# Patient Record
Sex: FEMALE | Race: WHITE | Employment: FULL TIME | URBAN - METROPOLITAN AREA
[De-identification: names, ages, dates, MRNs, and addresses within clinical notes are randomized per-mention and may not be internally consistent; named-entity substitution may affect disease eponyms.]

---

## 2017-01-06 ENCOUNTER — OFFICE VISIT (OUTPATIENT)
Dept: FAMILY MEDICINE CLINIC | Age: 57
End: 2017-01-06

## 2017-01-06 VITALS
DIASTOLIC BLOOD PRESSURE: 80 MMHG | WEIGHT: 207 LBS | HEART RATE: 83 BPM | RESPIRATION RATE: 13 BRPM | SYSTOLIC BLOOD PRESSURE: 122 MMHG | HEIGHT: 67 IN | BODY MASS INDEX: 32.49 KG/M2

## 2017-01-06 DIAGNOSIS — E55.9 VITAMIN D INSUFFICIENCY: ICD-10-CM

## 2017-01-06 DIAGNOSIS — E89.0 HISTORY OF THYROIDECTOMY: ICD-10-CM

## 2017-01-06 DIAGNOSIS — E78.5 HYPERLIPIDEMIA, UNSPECIFIED HYPERLIPIDEMIA TYPE: ICD-10-CM

## 2017-01-06 DIAGNOSIS — R23.2 HOT FLASHES: Primary | ICD-10-CM

## 2017-01-06 DIAGNOSIS — R73.01 IFG (IMPAIRED FASTING GLUCOSE): ICD-10-CM

## 2017-01-06 DIAGNOSIS — R53.83 OTHER FATIGUE: ICD-10-CM

## 2017-01-06 DIAGNOSIS — R45.89 DEPRESSED MOOD: ICD-10-CM

## 2017-01-06 PROCEDURE — 99214 OFFICE O/P EST MOD 30 MIN: CPT | Performed by: FAMILY MEDICINE

## 2017-01-06 RX ORDER — ESCITALOPRAM OXALATE 10 MG/1
10 TABLET ORAL DAILY
Qty: 30 TABLET | Refills: 1 | Status: SHIPPED | OUTPATIENT
Start: 2017-01-06 | End: 2017-02-03 | Stop reason: SDUPTHER

## 2017-01-06 ASSESSMENT — ENCOUNTER SYMPTOMS
GASTROINTESTINAL NEGATIVE: 1
RESPIRATORY NEGATIVE: 1

## 2017-01-06 ASSESSMENT — PATIENT HEALTH QUESTIONNAIRE - PHQ9
2. FEELING DOWN, DEPRESSED OR HOPELESS: 0
SUM OF ALL RESPONSES TO PHQ QUESTIONS 1-9: 0
SUM OF ALL RESPONSES TO PHQ9 QUESTIONS 1 & 2: 0
1. LITTLE INTEREST OR PLEASURE IN DOING THINGS: 0

## 2017-02-03 ENCOUNTER — OFFICE VISIT (OUTPATIENT)
Dept: FAMILY MEDICINE CLINIC | Age: 57
End: 2017-02-03

## 2017-02-03 VITALS
BODY MASS INDEX: 32.33 KG/M2 | HEART RATE: 72 BPM | RESPIRATION RATE: 16 BRPM | WEIGHT: 206 LBS | DIASTOLIC BLOOD PRESSURE: 84 MMHG | SYSTOLIC BLOOD PRESSURE: 136 MMHG | HEIGHT: 67 IN

## 2017-02-03 DIAGNOSIS — R45.89 DEPRESSED MOOD: ICD-10-CM

## 2017-02-03 DIAGNOSIS — J01.90 ACUTE RHINOSINUSITIS: Primary | ICD-10-CM

## 2017-02-03 PROCEDURE — 99213 OFFICE O/P EST LOW 20 MIN: CPT | Performed by: FAMILY MEDICINE

## 2017-02-03 RX ORDER — ESCITALOPRAM OXALATE 10 MG/1
10 TABLET ORAL DAILY
Qty: 90 TABLET | Refills: 3 | Status: SHIPPED | OUTPATIENT
Start: 2017-02-03 | End: 2017-12-31 | Stop reason: SDUPTHER

## 2017-02-03 RX ORDER — AMOXICILLIN AND CLAVULANATE POTASSIUM 875; 125 MG/1; MG/1
1 TABLET, FILM COATED ORAL 2 TIMES DAILY WITH MEALS
Qty: 14 TABLET | Refills: 0 | Status: SHIPPED | OUTPATIENT
Start: 2017-02-03 | End: 2017-02-10

## 2017-02-03 ASSESSMENT — ENCOUNTER SYMPTOMS
COUGH: 1
GASTROINTESTINAL NEGATIVE: 1
SINUS PRESSURE: 1

## 2017-12-31 DIAGNOSIS — R45.89 DEPRESSED MOOD: ICD-10-CM

## 2018-01-02 RX ORDER — ESCITALOPRAM OXALATE 10 MG/1
10 TABLET ORAL DAILY
Qty: 90 TABLET | Refills: 0 | Status: SHIPPED | OUTPATIENT
Start: 2018-01-02

## 2018-03-06 ENCOUNTER — HOSPITAL ENCOUNTER (OUTPATIENT)
Dept: WOMENS IMAGING | Age: 58
Discharge: HOME | End: 2018-03-06
Payer: COMMERCIAL

## 2018-03-06 DIAGNOSIS — Z12.31 VISIT FOR SCREENING MAMMOGRAM: ICD-10-CM

## 2018-03-06 PROCEDURE — 77063 BREAST TOMOSYNTHESIS BI: CPT

## 2019-04-19 ENCOUNTER — HOSPITAL ENCOUNTER (OUTPATIENT)
Dept: WOMENS IMAGING | Age: 59
Discharge: HOME | End: 2019-04-19
Payer: COMMERCIAL

## 2019-04-19 DIAGNOSIS — Z12.31 VISIT FOR SCREENING MAMMOGRAM: ICD-10-CM

## 2019-04-19 PROCEDURE — 77063 BREAST TOMOSYNTHESIS BI: CPT

## 2020-05-29 ENCOUNTER — HOSPITAL ENCOUNTER (OUTPATIENT)
Dept: WOMENS IMAGING | Age: 60
Discharge: HOME | End: 2020-05-29
Payer: COMMERCIAL

## 2020-05-29 PROCEDURE — 77063 BREAST TOMOSYNTHESIS BI: CPT

## 2021-06-22 ENCOUNTER — HOSPITAL ENCOUNTER (OUTPATIENT)
Dept: WOMENS IMAGING | Age: 61
Discharge: HOME | End: 2021-06-22
Payer: COMMERCIAL

## 2021-06-22 DIAGNOSIS — Z12.31 VISIT FOR SCREENING MAMMOGRAM: ICD-10-CM

## 2021-06-22 PROCEDURE — 77063 BREAST TOMOSYNTHESIS BI: CPT

## 2022-07-19 ENCOUNTER — HOSPITAL ENCOUNTER (OUTPATIENT)
Dept: WOMENS IMAGING | Age: 62
Discharge: HOME | End: 2022-07-19
Payer: COMMERCIAL

## 2022-07-19 DIAGNOSIS — Z12.31 VISIT FOR SCREENING MAMMOGRAM: ICD-10-CM

## 2022-07-19 PROCEDURE — 77067 SCR MAMMO BI INCL CAD: CPT

## 2023-10-02 ENCOUNTER — TELEPHONE (OUTPATIENT)
Dept: CARDIOLOGY CLINIC | Age: 63
End: 2023-10-02

## 2023-10-02 NOTE — TELEPHONE ENCOUNTER
Received a fax which is scanned in chart. Looks like referral to urology. Do they need something from cardiology? Called [phone] and LM for April to see if they are needing something from cardiology.

## 2023-10-06 NOTE — TELEPHONE ENCOUNTER
Have made multiple attempts to reach office on this. Closing encounter at this time. Encounter faxed to Dr Neymar Garrett office as an uguay.

## 2023-10-10 ENCOUNTER — OFFICE VISIT (OUTPATIENT)
Dept: CARDIOLOGY CLINIC | Age: 63
End: 2023-10-10
Payer: COMMERCIAL

## 2023-10-10 VITALS
HEIGHT: 67 IN | DIASTOLIC BLOOD PRESSURE: 72 MMHG | WEIGHT: 200 LBS | SYSTOLIC BLOOD PRESSURE: 146 MMHG | HEART RATE: 71 BPM | BODY MASS INDEX: 31.39 KG/M2

## 2023-10-10 DIAGNOSIS — R06.09 DYSPNEA ON EXERTION: Primary | ICD-10-CM

## 2023-10-10 DIAGNOSIS — R53.83 FATIGUE, UNSPECIFIED TYPE: ICD-10-CM

## 2023-10-10 DIAGNOSIS — G47.30 SLEEP APNEA, UNSPECIFIED TYPE: ICD-10-CM

## 2023-10-10 PROCEDURE — 99204 OFFICE O/P NEW MOD 45 MIN: CPT | Performed by: INTERNAL MEDICINE

## 2023-10-10 RX ORDER — ROSUVASTATIN CALCIUM 10 MG/1
10 TABLET, COATED ORAL DAILY
COMMUNITY

## 2023-10-10 RX ORDER — AMLODIPINE BESYLATE 5 MG/1
5 TABLET ORAL DAILY
COMMUNITY

## 2023-10-10 RX ORDER — M-VIT,TX,IRON,MINS/CALC/FOLIC 27MG-0.4MG
1 TABLET ORAL DAILY
COMMUNITY

## 2023-10-10 NOTE — PROGRESS NOTES
[address] [place]  Dept: [phone]  Dept Fax: [phone]  Loc: [phone]    Visit Date: 10/10/2023    Ms. Kristie Ibarra is a 66 y.o. female  who presented for:  Chief Complaint   Patient presents with    Establish Cardiologist    New Patient    Check-Up       HPI:   65 yo F c hx of thyroid cancer s/p surgery is here for shortness of breath/fatigue. Thinks the symtpoms initially started with infusion with covid infection. She occasionally has low pulse rate. She was unable to walk when she went to the [facility]. EKG shows Sinus bradycardia. She is set up for sleep study. Has been having some intermitent pedal edema. Current Outpatient Medications:     amLODIPine (NORVASC) 5 MG tablet, Take 1 tablet by mouth daily, Disp: , Rfl:     rosuvastatin (CRESTOR) 10 MG tablet, Take 1 tablet by mouth daily, Disp: , Rfl:     Multiple Vitamins-Minerals (THERAPEUTIC MULTIVITAMIN-MINERALS) tablet, Take 1 tablet by mouth daily, Disp: , Rfl:     Cholecalciferol (VITAMIN D3 PO), Take by mouth, Disp: , Rfl:     escitalopram (LEXAPRO) 10 MG tablet, TAKE 1 TABLET BY MOUTH  DAILY, Disp: 90 tablet, Rfl: 0    levothyroxine (SYNTHROID) 150 MCG tablet, Take 1 tablet by mouth Daily, Disp: , Rfl:     Past Medical History  Elysia Concepcion  has a past medical history of Cancer ([address]), Kidney stone, and Nausea & vomiting. Social History  Elysia Concepcion  reports that she has never smoked. She has never used smokeless tobacco. She reports that she does not drink alcohol and does not use drugs. Family History  Elysia Concepcion family history includes Cancer in her father; Diabetes in her mother; Heart Disease in her father; High Blood Pressure in her father; Kidney Disease in her father; Ovarian Cancer (age of onset: 48) in her mother.     Past Surgical History   Past Surgical History:   Procedure Laterality Date     SECTION  1348,7088 Quality 111:Pneumonia Vaccination Status For Older Adults: Pneumococcal Vaccination Previously Received Detail Level: Detailed Quality 130: Documentation Of Current Medications In The Medical Record: Current Medications Documented Quality 131: Pain Assessment And Follow-Up: Pain assessment using a standardized tool is documented as negative, no follow-up plan required Quality 110: Preventive Care And Screening: Influenza Immunization: Influenza Immunization previously received during influenza season

## 2023-10-12 LAB — B-TYPE NATRIURETIC PEPTIDE: 52 PG/ML

## 2023-10-23 ENCOUNTER — HOSPITAL ENCOUNTER (OUTPATIENT)
Dept: NON INVASIVE DIAGNOSTICS | Age: 63
Discharge: HOME | End: 2023-10-23
Attending: INTERNAL MEDICINE
Payer: COMMERCIAL

## 2023-10-23 VITALS — WEIGHT: 200 LBS | BODY MASS INDEX: 31.39 KG/M2 | HEIGHT: 67 IN

## 2023-10-23 DIAGNOSIS — R06.09 DYSPNEA ON EXERTION: ICD-10-CM

## 2023-10-23 DIAGNOSIS — R53.83 FATIGUE, UNSPECIFIED TYPE: ICD-10-CM

## 2023-10-23 PROCEDURE — 78452 HT MUSCLE IMAGE SPECT MULT: CPT | Performed by: INTERNAL MEDICINE

## 2023-10-23 PROCEDURE — 93270 REMOTE 30 DAY ECG REV/REPORT: CPT

## 2023-10-23 PROCEDURE — 93017 CV STRESS TEST TRACING ONLY: CPT | Performed by: INTERNAL MEDICINE

## 2023-10-23 PROCEDURE — 6360000002 HC RX W HCPCS

## 2023-10-23 PROCEDURE — A9500 TC99M SESTAMIBI: HCPCS | Performed by: INTERNAL MEDICINE

## 2023-10-23 PROCEDURE — 93306 TTE W/DOPPLER COMPLETE: CPT

## 2023-10-23 PROCEDURE — 3430000000 HC RX DIAGNOSTIC RADIOPHARMACEUTICAL: Performed by: INTERNAL MEDICINE

## 2023-10-23 RX ORDER — TETRAKIS(2-METHOXYISOBUTYLISOCYANIDE)COPPER(I) TETRAFLUOROBORATE 1 MG/ML
30.8 INJECTION, POWDER, LYOPHILIZED, FOR SOLUTION INTRAVENOUS
Status: COMPLETED | OUTPATIENT
Start: 2023-10-23 | End: 2023-10-23

## 2023-10-23 RX ORDER — TETRAKIS(2-METHOXYISOBUTYLISOCYANIDE)COPPER(I) TETRAFLUOROBORATE 1 MG/ML
9.5 INJECTION, POWDER, LYOPHILIZED, FOR SOLUTION INTRAVENOUS
Status: COMPLETED | OUTPATIENT
Start: 2023-10-23 | End: 2023-10-23

## 2023-10-23 RX ADMIN — Medication 9.5 MILLICURIE: at 08:15

## 2023-10-23 RX ADMIN — Medication 30.8 MILLICURIE: at 09:04

## 2023-10-23 NOTE — PROCEDURES
Received critical report for cardiac event monitor. .faxed report to cardiology and perfect served Dr. Altagracia Jimenez

## 2023-10-23 NOTE — PROCEDURES
14 Day cardiac event monitor was applied to patient. Instructions were given and skin/monitor prep and application was demonstrated. Patient was instructed to remove monitor on 11/6 and mail back to [employer].

## 2023-10-24 ENCOUNTER — TELEPHONE (OUTPATIENT)
Dept: CARDIOLOGY CLINIC | Age: 63
End: 2023-10-24

## 2023-10-24 DIAGNOSIS — R94.31 ABNORMAL PATIENT-ACTIVATED CARDIAC EVENT MONITOR: Primary | ICD-10-CM

## 2023-10-25 ENCOUNTER — TELEPHONE (OUTPATIENT)
Dept: CARDIOLOGY CLINIC | Age: 63
End: 2023-10-25

## 2023-10-25 ENCOUNTER — OFFICE VISIT (OUTPATIENT)
Dept: CARDIOLOGY CLINIC | Age: 63
End: 2023-10-25
Payer: COMMERCIAL

## 2023-10-25 VITALS
HEART RATE: 60 BPM | WEIGHT: 228 LBS | OXYGEN SATURATION: 97 % | HEIGHT: 67 IN | DIASTOLIC BLOOD PRESSURE: 73 MMHG | SYSTOLIC BLOOD PRESSURE: 129 MMHG | BODY MASS INDEX: 35.79 KG/M2

## 2023-10-25 DIAGNOSIS — R94.31 ABNORMAL PATIENT-ACTIVATED CARDIAC EVENT MONITOR: Primary | ICD-10-CM

## 2023-10-25 PROCEDURE — 93000 ELECTROCARDIOGRAM COMPLETE: CPT | Performed by: INTERNAL MEDICINE

## 2023-10-25 PROCEDURE — 99204 OFFICE O/P NEW MOD 45 MIN: CPT | Performed by: INTERNAL MEDICINE

## 2023-10-25 NOTE — TELEPHONE ENCOUNTER
Dr Reinier Comer? Procedure: Dual Pacer L Bundle-MDT  Date: 11/17/23  Arrival Time: 0500  Meds to Hold: none    Instructions verbalized to the patient. Instructions given to the patient at the time of visit.

## 2023-10-25 NOTE — PATIENT INSTRUCTIONS
You may receive a survey regarding the care you received during your visit. Your input is valuable to us. We encourage you to complete and return your survey. We hope you will choose us in the future for your healthcare needs. Thank you for choosing [address] [place] [address]!     Your Medical Assistant Today:  Isidro Murillo      Your RN Today:  Ab Kuhn  Your Provider for Today: Dr. Aryan Isaacs  Ph. [phone] opt 1

## 2023-10-25 NOTE — PROGRESS NOTES
Physical Examination:  There were no vitals filed for this visit. There is no height or weight on file to calculate BMI. No intake or output data in the 24 hours ending 10/25/23 1427  [unfilled]   GENERAL: Alert and oriented. No distress. EYES: No pallor or icterus. ENT: No cyanosis. No thyromegaly or cervical LAP. VESSELS: No jugular venous distension or carotid bruits. HEART: Normal S1/S2. No murmur, rub or gallop. LUNGS: Clear to auscultation. ABDOMEN: Soft and non-tender. EXTREMITIES: No lower extremity edema. Feet are warm. NEUROLOGICAL: Grossly normal.     Laboratory And Diagnostic Data  I have personally reviewed and interpreted the results of the following diagnostic testing    Lab Results   Component Value Date    WBC 7.5 01/06/2017    HGB 14.3 01/06/2017    HCT 42.3 01/06/2017     01/06/2017    CHOL 251 (H) 01/06/2017    TRIG 164 01/06/2017    HDL 60 01/06/2017    ALT 52 02/03/2015    AST 37 02/03/2015     08/21/2015    K 4.1 08/21/2015     08/21/2015    CREATININE 0.7 08/21/2015    BUN 16 08/21/2015    CO2 27 08/21/2015    TSH 0.08 07/19/2016    LABA1C 5.2 01/06/2017            Echocardiogram dated 10/20/2023: 8. LVEF 60%, LVWT 1.1 cm, LAD/ADEOLA 26. No significant valvular abnormalities. ECG sinus rhythm, rate 58 bpm.  Prolonged OR interval.  Exercise myocardial perfusion 10/23/2023: Scan: Negative for ischemia. Progression of his degree AV block to complete AV block. Event monitor: Intermittent sinus bradycardia and high-grade AV block with a ventricular rate between 40 to 45 bpm occurring while awake corresponding to the patient's symptoms (10/24/2023 at 12:03 PM and 10/22/2023 at 8:10 AM). Impression:  Exertional fatigue, dizziness and lack of energy. Intermittent high-grade AV block. Hypertension and obesity. This of snoring, scheduled to have sleep study. Assessment And Recommendations:  No apparent reversible reason for patient's AV block.   She

## 2023-11-17 ENCOUNTER — HOSPITAL ENCOUNTER (OUTPATIENT)
Age: 63
Setting detail: OUTPATIENT SURGERY
Discharge: HOME | End: 2023-11-17
Attending: INTERNAL MEDICINE | Admitting: INTERNAL MEDICINE
Payer: COMMERCIAL

## 2023-11-17 ENCOUNTER — APPOINTMENT (OUTPATIENT)
Dept: GENERAL RADIOLOGY | Age: 63
End: 2023-11-17
Attending: INTERNAL MEDICINE
Payer: COMMERCIAL

## 2023-11-17 VITALS
DIASTOLIC BLOOD PRESSURE: 70 MMHG | HEIGHT: 67 IN | RESPIRATION RATE: 17 BRPM | OXYGEN SATURATION: 96 % | SYSTOLIC BLOOD PRESSURE: 132 MMHG | HEART RATE: 64 BPM | WEIGHT: 215 LBS | BODY MASS INDEX: 33.74 KG/M2

## 2023-11-17 DIAGNOSIS — I44.39 HIGH-GRADE ATRIOVENTRICULAR BLOCK: ICD-10-CM

## 2023-11-17 LAB
ALBUMIN SERPL BCG-MCNC: 4.1 G/DL (ref 3.5–5.1)
ALP SERPL-CCNC: 108 U/L (ref 38–126)
ALT SERPL W/O P-5'-P-CCNC: 18 U/L (ref 11–66)
ANION GAP SERPL CALC-SCNC: 9 MEQ/L (ref 8–16)
APTT PPP: 30.2 SECONDS (ref 22–38)
AST SERPL-CCNC: 18 U/L (ref 5–40)
BASOPHILS ABSOLUTE: 0 THOU/MM3 (ref 0–0.1)
BASOPHILS NFR BLD AUTO: 0.6 %
BILIRUB SERPL-MCNC: 0.5 MG/DL (ref 0.3–1.2)
BUN SERPL-MCNC: 17 MG/DL (ref 7–22)
CALCIUM SERPL-MCNC: 8.6 MG/DL (ref 8.5–10.5)
CHLORIDE SERPL-SCNC: 106 MEQ/L (ref 98–111)
CO2 SERPL-SCNC: 28 MEQ/L (ref 23–33)
CREAT SERPL-MCNC: 0.8 MG/DL (ref 0.4–1.2)
DEPRECATED RDW RBC AUTO: 41.6 FL (ref 35–45)
ECHO BSA: 2.15 M2
EOSINOPHIL NFR BLD AUTO: 3.9 %
EOSINOPHILS ABSOLUTE: 0.2 THOU/MM3 (ref 0–0.4)
ERYTHROCYTE [DISTWIDTH] IN BLOOD BY AUTOMATED COUNT: 12.4 % (ref 11.5–14.5)
GFR SERPL CREATININE-BSD FRML MDRD: > 60 ML/MIN/1.73M2
GLUCOSE SERPL-MCNC: 123 MG/DL (ref 70–108)
HCT VFR BLD AUTO: 39.2 % (ref 37–47)
HGB BLD-MCNC: 12.6 GM/DL (ref 12–16)
IMM GRANULOCYTES # BLD AUTO: 0.01 THOU/MM3 (ref 0–0.07)
IMM GRANULOCYTES NFR BLD AUTO: 0.2 %
INR PPP: 0.98 (ref 0.85–1.13)
LYMPHOCYTES ABSOLUTE: 2 THOU/MM3 (ref 1–4.8)
LYMPHOCYTES NFR BLD AUTO: 31.5 %
MCH RBC QN AUTO: 29.4 PG (ref 26–33)
MCHC RBC AUTO-ENTMCNC: 32.1 GM/DL (ref 32.2–35.5)
MCV RBC AUTO: 91.6 FL (ref 81–99)
MONOCYTES ABSOLUTE: 0.5 THOU/MM3 (ref 0.4–1.3)
MONOCYTES NFR BLD AUTO: 8.4 %
NEUTROPHILS NFR BLD AUTO: 55.4 %
NRBC BLD AUTO-RTO: 0 /100 WBC
PLATELET # BLD AUTO: 215 THOU/MM3 (ref 130–400)
PMV BLD AUTO: 8.6 FL (ref 9.4–12.4)
POTASSIUM SERPL-SCNC: 4 MEQ/L (ref 3.5–5.2)
PROT SERPL-MCNC: 6.6 G/DL (ref 6.1–8)
RBC # BLD AUTO: 4.28 MILL/MM3 (ref 4.2–5.4)
SEGMENTED NEUTROPHILS ABSOLUTE COUNT: 3.5 THOU/MM3 (ref 1.8–7.7)
SODIUM SERPL-SCNC: 143 MEQ/L (ref 135–145)
WBC # BLD AUTO: 6.4 THOU/MM3 (ref 4.8–10.8)

## 2023-11-17 PROCEDURE — C1887 CATHETER, GUIDING: HCPCS | Performed by: INTERNAL MEDICINE

## 2023-11-17 PROCEDURE — 99152 MOD SED SAME PHYS/QHP 5/>YRS: CPT | Performed by: INTERNAL MEDICINE

## 2023-11-17 PROCEDURE — 2500000003 HC RX 250 WO HCPCS: Performed by: INTERNAL MEDICINE

## 2023-11-17 PROCEDURE — 6360000002 HC RX W HCPCS: Performed by: INTERNAL MEDICINE

## 2023-11-17 PROCEDURE — 33208 INSRT HEART PM ATRIAL & VENT: CPT | Performed by: INTERNAL MEDICINE

## 2023-11-17 PROCEDURE — 2580000003 HC RX 258: Performed by: INTERNAL MEDICINE

## 2023-11-17 PROCEDURE — C1894 INTRO/SHEATH, NON-LASER: HCPCS | Performed by: INTERNAL MEDICINE

## 2023-11-17 PROCEDURE — 93005 ELECTROCARDIOGRAM TRACING: CPT | Performed by: INTERNAL MEDICINE

## 2023-11-17 PROCEDURE — 85025 COMPLETE CBC W/AUTO DIFF WBC: CPT

## 2023-11-17 PROCEDURE — 33206 INSERT HEART PM ATRIAL: CPT | Performed by: INTERNAL MEDICINE

## 2023-11-17 PROCEDURE — 6370000000 HC RX 637 (ALT 250 FOR IP): Performed by: INTERNAL MEDICINE

## 2023-11-17 PROCEDURE — 2709999900 HC NON-CHARGEABLE SUPPLY: Performed by: INTERNAL MEDICINE

## 2023-11-17 PROCEDURE — 93010 ELECTROCARDIOGRAM REPORT: CPT | Performed by: NUCLEAR MEDICINE

## 2023-11-17 PROCEDURE — 71046 X-RAY EXAM CHEST 2 VIEWS: CPT

## 2023-11-17 PROCEDURE — 6360000004 HC RX CONTRAST MEDICATION: Performed by: INTERNAL MEDICINE

## 2023-11-17 PROCEDURE — 80053 COMPREHEN METABOLIC PANEL: CPT

## 2023-11-17 PROCEDURE — 85730 THROMBOPLASTIN TIME PARTIAL: CPT

## 2023-11-17 PROCEDURE — 33225 L VENTRIC PACING LEAD ADD-ON: CPT | Performed by: INTERNAL MEDICINE

## 2023-11-17 PROCEDURE — 36415 COLL VENOUS BLD VENIPUNCTURE: CPT

## 2023-11-17 PROCEDURE — 93010 ELECTROCARDIOGRAM REPORT: CPT | Performed by: INTERNAL MEDICINE

## 2023-11-17 PROCEDURE — 85610 PROTHROMBIN TIME: CPT

## 2023-11-17 PROCEDURE — 99153 MOD SED SAME PHYS/QHP EA: CPT | Performed by: INTERNAL MEDICINE

## 2023-11-17 RX ORDER — SODIUM CHLORIDE 0.9 % (FLUSH) 0.9 %
5-40 SYRINGE (ML) INJECTION PRN
Status: DISCONTINUED | OUTPATIENT
Start: 2023-11-17 | End: 2023-11-17 | Stop reason: HOSPADM

## 2023-11-17 RX ORDER — ONDANSETRON 2 MG/ML
4 INJECTION INTRAMUSCULAR; INTRAVENOUS EVERY 6 HOURS PRN
Status: DISCONTINUED | OUTPATIENT
Start: 2023-11-17 | End: 2023-11-17 | Stop reason: HOSPADM

## 2023-11-17 RX ORDER — SODIUM CHLORIDE 9 MG/ML
INJECTION, SOLUTION INTRAVENOUS PRN
Status: DISCONTINUED | OUTPATIENT
Start: 2023-11-17 | End: 2023-11-17 | Stop reason: HOSPADM

## 2023-11-17 RX ORDER — SODIUM CHLORIDE 0.9 % (FLUSH) 0.9 %
5-40 SYRINGE (ML) INJECTION EVERY 12 HOURS SCHEDULED
Status: DISCONTINUED | OUTPATIENT
Start: 2023-11-17 | End: 2023-11-17 | Stop reason: HOSPADM

## 2023-11-17 RX ORDER — SODIUM CHLORIDE 9 MG/ML
INJECTION, SOLUTION INTRAVENOUS CONTINUOUS PRN
Status: COMPLETED | OUTPATIENT
Start: 2023-11-17 | End: 2023-11-17

## 2023-11-17 RX ORDER — ACETAMINOPHEN 325 MG/1
650 TABLET ORAL EVERY 4 HOURS PRN
Status: DISCONTINUED | OUTPATIENT
Start: 2023-11-17 | End: 2023-11-17 | Stop reason: HOSPADM

## 2023-11-17 RX ORDER — FENTANYL CITRATE 50 UG/ML
INJECTION, SOLUTION INTRAMUSCULAR; INTRAVENOUS PRN
Status: DISCONTINUED | OUTPATIENT
Start: 2023-11-17 | End: 2023-11-17 | Stop reason: HOSPADM

## 2023-11-17 RX ORDER — MIDAZOLAM HYDROCHLORIDE 1 MG/ML
INJECTION INTRAMUSCULAR; INTRAVENOUS PRN
Status: DISCONTINUED | OUTPATIENT
Start: 2023-11-17 | End: 2023-11-17 | Stop reason: HOSPADM

## 2023-11-17 RX ORDER — SODIUM CHLORIDE 9 MG/ML
INJECTION, SOLUTION INTRAVENOUS CONTINUOUS
Status: DISCONTINUED | OUTPATIENT
Start: 2023-11-17 | End: 2023-11-17 | Stop reason: HOSPADM

## 2023-11-17 RX ADMIN — SODIUM CHLORIDE: 9 INJECTION, SOLUTION INTRAVENOUS at 05:51

## 2023-11-17 RX ADMIN — Medication 2000 MG: at 06:24

## 2023-11-17 RX ADMIN — ACETAMINOPHEN 650 MG: 325 TABLET ORAL at 11:57

## 2023-11-17 ASSESSMENT — PAIN SCALES - GENERAL: PAINLEVEL_OUTOF10: 5

## 2023-11-17 NOTE — BRIEF OP NOTE
Brief Postoperative Note    Date:   11/17/2023  Patient name: Elvira Rivas  YOB: 1960  Sex: female   MRN:   [id number]    PCP: Christena Najjar, APRN - CNP     Procedure:Dual Chamber pacemaker implantation    Pre-Op Diagnosis: SSS and AV block,    Post-Op Diagnosis: Same    Surgeon: Kendy Scruggs MD, [facility], [facility] [place]    Assistant: Corey Martell. Anesthesia/sedation:  Local lidocaine/fentanyl and midazolam as needed. Estimated Blood Loss (mL): Minimal    Complications: None    Recommendations:  See Epic orders. Bed rest for 2 hours. Keep pocket site dry. No shower for 7 days ((sponge bath and tub bath OK). ICE packs locally. Monitor site for bleeding or swelling. Pressure dressing x 24 hours. Chest x-ray PA and lateral views. Follow up in device clinic in 1 week.        Electronically signed by Marc Fuller MD, [facility] on 11/17/2023 at 9:09 AM

## 2023-11-17 NOTE — DISCHARGE INSTRUCTIONS
Activity     You should gradually return to your normal activities. For the first 4 weeks:         Do not lift more than 10 pounds         Do not swim, golf, bowl or vacuum          Do not raise your device side arm above your shoulder for 30 days         At your two-week follow-up visit, ask your doctor which  of the above activities you can resume         ALWAYS avoid any contact sports or hard blows to the chest or abdomen         Avoid sleeping on the left side and face down     You may resume your sexual activity when you feel ready. Wound care      Remove Safeguard dressing in 24 hours, may remove gauze dressing in 48 hours,  leave steri strips on . Keep your incision dry for at least 7 days, and then you may shower. Sponge bathes around the device insertion site for the first week, keeping the incision dry. Do not apply any ointment, talc, or lotion to the incision. Do not scratch, rub or touch the incision with your hands     The steri-strips (tape strips) will be removed at your follow-up visit, if they have not yet peeled off on their own     Call your doctor immediately if your incision looks re, becomes swollen or tender, or begins to ooze fluid. Also, notify your doctor at once if you develop   A temperature  greater than 100 degrees Fahrenheit. You must avoid:                Airport magnet security wants                Diathermy or other heart treatments                Arc or resistance welding                Electrical power generator plants                Electric cautery that is used for surgical procedures                MRI scans                Radio or television transmitting towers    Special Precautions: You must stay at arm's length from magnets of any kind       Cellular phones should be used on the ear opposite to your device.   Do not keep cellular phones in a pocket over your pacemaker          To avoid any interference with your device, you must pass

## 2023-11-17 NOTE — PROGRESS NOTES
0500  Pt admitted to  2E04 ambulatory for pacemaker implant. Pt NPO. Patient accompanied by spouse. Vital signs obtained. Assessment and data collection initiated. Oriented to room. Policies and procedures for 2E explained   All questions answered with no further questions at this time. Fall prevention and safety precautions discussed with patient. Care plan reviewed with patient and spouse. Patient and spouse verbalize understanding of the plan of care and contribute to goal setting.

## 2023-11-17 NOTE — PROCEDURES
mcg intravenously. Cefazolin 2 gm intravenously for surgical prophylaxis. Lidocaine 2%, 20 cc for local anaesthesia. Visipaque 30 cc subcutaneously for local anesthesia. Fluoroscopy Time:  2.2 minutes. Blood Loss:  Minimal.     Complication:  None. Implanted Device:       Intraoperative Electric Parameters:      Bradycardia Settings:  MVP  pulses per minute. Summary:  Successful dual chamber pacemaker implantation. Normal pacemaker functions. Recommendations   Observation for 2 hours. Chest x-ray (2 views) and device check. Discharge and post operative care per protocol.        Electronically signed by Bill Felix MD, [facility] on 11/17/2023 at 9:13 AM

## 2023-11-17 NOTE — H&P
[facility] ()  [address] [place] [id number]  H&P and SEDATION/ANALGESIA     Patient demographics:  Date:   2023  Patient name: Randolm Apgar  YOB: 1960  Sex: female   MRN:   [id number]    Reason for admission or planned procedure:  Dual Chamber pacemaker implantation    Code Status: Full Code    Consent:The risk and benefits of PAcemaker implantation including pneumothorax, hemothorax, bleeding, vascular complications, infection, pericardial tamponade requiring emergency pericardiocentesis, MI, death, exposure to radiation, lead dislodgement requiring reposition, future operations for generator change or device revision or upgrade were discussed with the patient. She verbalized understanding of the discussion. Her questions were answered. The patient wishes to proceed. Brief clinical summary:  63/F with intermittent symptomatic sinus bradycardia and high grade AV block (negative work up and preserved LVEF), electively presents for a dual chamber pacemaker implantation. Hx: COVID-19 infection , Intermittent high grade AV block (event motor 10/2023), CHB during pharmacological stress test, obesity, history of snoring. Past Medical History:  Past Medical History:   Diagnosis Date    Cancer ([address])     thyroid    Hyperlipidemia     Hypertension     Kidney stone     Nausea & vomiting        Past Surgical History:  Past Surgical History:   Procedure Laterality Date     SECTION  1192,1139    CYSTOSCOPY  2014    Lt. Ureteroscopy, Laser Lithotripsy, Stent    HYSTERECTOMY (CERVIX STATUS UNKNOWN)      age 46    KIDNEY STONE SURGERY      OVARY REMOVAL Bilateral     THYROIDECTOMY      TONSILLECTOMY      CHILDHOOD        Allergies:    Allergies as of 2023    (No Known Allergies)     Additional information:       Medications     Current Facility-Administered Medications:     0.9 % sodium chloride infusion, ,

## 2023-11-19 LAB
EKG ATRIAL RATE: 61 BPM
EKG ATRIAL RATE: 62 BPM
EKG ATRIAL RATE: 72 BPM
EKG P AXIS: 49 DEGREES
EKG P AXIS: 64 DEGREES
EKG P AXIS: 71 DEGREES
EKG P-R INTERVAL: 182 MS
EKG P-R INTERVAL: 202 MS
EKG P-R INTERVAL: 204 MS
EKG Q-T INTERVAL: 426 MS
EKG Q-T INTERVAL: 426 MS
EKG Q-T INTERVAL: 480 MS
EKG QRS DURATION: 74 MS
EKG QRS DURATION: 88 MS
EKG QRS DURATION: 92 MS
EKG QTC CALCULATION (BAZETT): 432 MS
EKG QTC CALCULATION (BAZETT): 466 MS
EKG QTC CALCULATION (BAZETT): 483 MS
EKG R AXIS: 11 DEGREES
EKG R AXIS: 20 DEGREES
EKG R AXIS: 34 DEGREES
EKG T AXIS: 1 DEGREES
EKG T AXIS: 172 DEGREES
EKG T AXIS: 38 DEGREES
EKG VENTRICULAR RATE: 61 BPM
EKG VENTRICULAR RATE: 62 BPM
EKG VENTRICULAR RATE: 72 BPM

## 2023-11-21 ENCOUNTER — NURSE ONLY (OUTPATIENT)
Dept: CARDIOLOGY CLINIC | Age: 63
End: 2023-11-21

## 2023-11-21 NOTE — PROGRESS NOTES
Pt to office today to have pacer implant site assessed. Slight swelling. Encouraged ice. Bruising. Small healing blister noted, likely from dressing that was removed. Improving per patient. Has incision check 11/27/23. Will remove steri strips than.

## 2023-11-27 ENCOUNTER — NURSE ONLY (OUTPATIENT)
Dept: CARDIOLOGY CLINIC | Age: 63
End: 2023-11-27
Payer: COMMERCIAL

## 2023-11-27 DIAGNOSIS — Z95.0 ARTIFICIAL PACEMAKER: Primary | ICD-10-CM

## 2023-11-27 PROCEDURE — 93280 PM DEVICE PROGR EVAL DUAL: CPT | Performed by: INTERNAL MEDICINE

## 2023-11-27 NOTE — PROGRESS NOTES
In Office Medtronic Dual Pacemaker   Patient of Deb    Has CL Yudy    Implanted 11/17/23 for heart block    Steri strips intact with old drainage, minor puffiness, shadow of bruising, tender to touch. Old steri-strips removed. Incision cleaned. Picture taken. New steri-strips applied. If, at any point, there is any increased redness, swelling, increased discomfort, fever, or the incision opens up, the patient is aware to go to the emergency room    Reviewed restrictions, incision care, and home monitoring.      Battery 13.9 years    Presenting rhythm AS   Underlying 2:1 block     A Impedance 475  RV Impedance 589    P wave sensing 2.3  R wave sensing 11.3    A Threshold 0.5 @ 0.40  A Amplitude 3.5 @ 0.40  RV Thresholds 1 @ 0.40  RV Amplitude 3.5 @ 0.40      A Paced 20.9%  V Paced 5.3%    Programmed Mode AAIR <=> DDDR       Afib [place] 0%    Episodes   none

## 2023-11-28 ENCOUNTER — HOSPITAL ENCOUNTER (OUTPATIENT)
Dept: CT IMAGING | Age: 63
Discharge: HOME | End: 2023-11-28
Attending: UROLOGY
Payer: COMMERCIAL

## 2023-11-28 DIAGNOSIS — N20.0 CALCULUS OF KIDNEY: ICD-10-CM

## 2023-11-28 PROCEDURE — 6360000004 HC RX CONTRAST MEDICATION: Performed by: UROLOGY

## 2023-11-28 PROCEDURE — 74170 CT ABD WO CNTRST FLWD CNTRST: CPT

## 2023-11-28 RX ADMIN — IOPAMIDOL 100 ML: 755 INJECTION, SOLUTION INTRAVENOUS at 07:31

## 2023-12-26 ENCOUNTER — TELEPHONE (OUTPATIENT)
Dept: FAMILY MEDICINE CLINIC | Age: 63
End: 2023-12-26

## 2023-12-26 NOTE — TELEPHONE ENCOUNTER
Spoke with patient- advised patient that Dr. Deyvi Mcgovern template is not made out that far- she will try to call back around summer time to get scheduled

## 2023-12-26 NOTE — TELEPHONE ENCOUNTER
----- Message from Shy Veliz sent at 12/26/2023  1:15 PM EST -----  Subject: Appointment Request    Reason for Call: New Patient/New to Provider Appointment needed: New   Patient Request Appointment    QUESTIONS    Reason for appointment request? No appointments available during search     Additional Information for Provider? Pt was told that Dr Arabella Warren would be   taking appts in the office starting in October and that she could call to   get put on her schedule for then.  She would like to get scheduled with   her.   ---------------------------------------------------------------------------  --------------  Maribel REAGAN  [phone]; OK to leave message on voicemail  ---------------------------------------------------------------------------  --------------  SCRIPT ANSWERS

## 2024-03-05 ENCOUNTER — NURSE ONLY (OUTPATIENT)
Dept: CARDIOLOGY CLINIC | Age: 64
End: 2024-03-05
Payer: COMMERCIAL

## 2024-03-05 DIAGNOSIS — Z95.0 PACEMAKER: Primary | ICD-10-CM

## 2024-03-05 NOTE — PROGRESS NOTES
In Office Medtronic Dual Pacemaker 3830  Patient of Deb    Pt is feeling great!    Has CL carol  3m chronic appt     Battery 13-14 years    Presenting rhythm ASVS    A Impedance 532  RV Impedance 722    P wave sensing 0.8  R wave sensing 13.8    A Threshold 0.5 @ 0.40  A Amplitude 3.5 @ 0.40-- decreased to 2  RV Thresholds 1.5 @ 0.40 // 1 @ 1  RV Amplitude 3.5 @ 0.40 - decreased to 3.25      A Paced 13%  V Paced 49.8%    Programmed Mode AAIR <=> DDDR 60      Afib [place] <0.1%    Episodes   none

## 2024-03-07 PROCEDURE — 93280 PM DEVICE PROGR EVAL DUAL: CPT | Performed by: INTERNAL MEDICINE

## 2024-05-13 ENCOUNTER — PROCEDURE VISIT (OUTPATIENT)
Dept: CARDIOLOGY CLINIC | Age: 64
End: 2024-05-13

## 2024-05-13 DIAGNOSIS — Z95.0 PACEMAKER: Primary | ICD-10-CM

## 2024-05-13 NOTE — PROGRESS NOTES
[facility] Medtronic Dual Pacemaker   Patient of Nallu    Battery 13.5 years    Presenting rhythm ASVP    A Impedance 399  RV Impedance 722    P wave sensing 1.4  R wave sensing 11.8    A Threshold 0.375 @ 0.40  A Amplitude 1.50 @ 0.40  RV Thresholds 1.50 @ 0.40  RV Amplitude 2.25 @ 0.40      A Paced 12.8%  V Paced 13.4%    Programmed Mode AAIR <=> DDDR       Afib [place] 0%    Episodes   none

## 2024-06-19 ENCOUNTER — TELEPHONE (OUTPATIENT)
Dept: CARDIOLOGY CLINIC | Age: 64
End: 2024-06-19

## 2024-06-19 NOTE — TELEPHONE ENCOUNTER
Pt called stating she had a CT chest showing a dilated aorta.  She would like for Dr Boyd to look at it and let know what to do, as she is getting discouraged and states it's always something new.      Called [facility] for report. She has a CTA Chest and Abdomen.  They will be faxing reports.  Please send to Dr Boyd once we receive.

## 2024-06-20 ENCOUNTER — TELEPHONE (OUTPATIENT)
Dept: FAMILY MEDICINE CLINIC | Age: 64
End: 2024-06-20

## 2024-06-20 NOTE — TELEPHONE ENCOUNTER
----- Message from Teresa Lopez Wizan sent at 6/20/2024 12:44 PM EDT -----  Regarding: [facility] Appointment Request  [facility] Appointment Request    Patient needs appointment for [facility] Appointment Type: New to Provider.    Reason for Appointment Request: No appointments available during search  --------------------------------------------------------------------------------------------------------------------------    Relationship to Patient: Self     Call Back Information: OK to leave message on voicemail  Preferred Call Back Number: Phone [phone]       The patient asking New provider her is Dr. Susanna Marks, her preferred in Oct. 2024 Or in the Oct. When she's back on the office morning.

## 2024-06-20 NOTE — TELEPHONE ENCOUNTER
I called Vivienne and scheduled her and her  Ahsan to be seen in the [place] office with Dr. Susanna Marks on 10/22/2024.

## 2024-06-24 NOTE — TELEPHONE ENCOUNTER
Spoke to patient, notified.  She will plan to discuss further with Dr Boyd at August appointment. She would like to wait and have further testing ordered at that time as well.  Note added to appointment desk in this regard.

## 2024-06-24 NOTE — TELEPHONE ENCOUNTER
Pt called wanting to see if results have been reviewed yet? Pt is leaving for [facility] on Wednesday. Spoke to pt informed her we just received records this morning and Dr. Boyd has not reviewed yet Today

## 2024-08-12 ENCOUNTER — OFFICE VISIT (OUTPATIENT)
Dept: CARDIOLOGY CLINIC | Age: 64
End: 2024-08-12
Payer: COMMERCIAL

## 2024-08-12 VITALS
HEIGHT: 67 IN | HEART RATE: 70 BPM | DIASTOLIC BLOOD PRESSURE: 93 MMHG | WEIGHT: 234 LBS | BODY MASS INDEX: 36.73 KG/M2 | SYSTOLIC BLOOD PRESSURE: 160 MMHG

## 2024-08-12 DIAGNOSIS — Z95.0 HX OF CARDIAC PACEMAKER: Primary | ICD-10-CM

## 2024-08-12 PROCEDURE — 99214 OFFICE O/P EST MOD 30 MIN: CPT | Performed by: INTERNAL MEDICINE

## 2024-08-12 NOTE — PROGRESS NOTES
[facility] [place] [facility]  [facility] CARDIOLOGY  [address]  [address]  [facility] [address]  Dept: [phone]  Dept Fax: [phone]  Loc: [phone]    Visit Date: 2024    Ms. John Espinosa is a 64 y.o. female  who presented for:  Chief Complaint   Patient presents with    Follow-up     8 month fu        HPI:   64 yo F c hx of thyroid cancer s/p surgery  is here for a follow up.  She had questions about a recent CT chest which showed 3.7 cm of ascending aorta.  She is set up for sleep study.  Has been having some intermitent pedal edema. She underwent PPM.          Current Outpatient Medications:     amLODIPine (NORVASC) 5 MG tablet, Take 1 tablet by mouth daily, Disp: , Rfl:     rosuvastatin (CRESTOR) 10 MG tablet, Take 1 tablet by mouth daily, Disp: , Rfl:     Multiple Vitamins-Minerals (THERAPEUTIC MULTIVITAMIN-MINERALS) tablet, Take 1 tablet by mouth daily, Disp: , Rfl:     Cholecalciferol (VITAMIN D3 PO), Take by mouth, Disp: , Rfl:     escitalopram (LEXAPRO) 10 MG tablet, TAKE 1 TABLET BY MOUTH  DAILY, Disp: 90 tablet, Rfl: 0    levothyroxine (SYNTHROID) 150 MCG tablet, Take 115 mcg by mouth Daily Taking 115mcg, Disp: , Rfl:     Past Medical History  Vivienne  has a past medical history of Cancer (HCC), Hyperlipidemia, Hypertension, Kidney stone, and Nausea & vomiting.    Social History  Vivienne  reports that she has never smoked. She has never used smokeless tobacco. She reports current alcohol use. She reports that she does not use drugs.    Family History  Vivienne family history includes Cancer in her father; Diabetes in her mother; Heart Disease in her father; High Blood Pressure in her father; Kidney Disease in her father; Ovarian Cancer (age of onset: 50) in her mother.    Past Surgical History   Past Surgical History:   Procedure Laterality Date     SECTION  ,    CYSTOSCOPY  2014    Lt. Ureteroscopy, Laser Lithotripsy, Stent    EP DEVICE

## 2024-08-19 ENCOUNTER — PROCEDURE VISIT (OUTPATIENT)
Dept: CARDIOLOGY CLINIC | Age: 64
End: 2024-08-19
Payer: COMMERCIAL

## 2024-08-19 DIAGNOSIS — Z95.0 PACEMAKER: Primary | ICD-10-CM

## 2024-08-19 PROCEDURE — 93294 REM INTERROG EVL PM/LDLS PM: CPT | Performed by: INTERNAL MEDICINE

## 2024-08-19 PROCEDURE — 93296 REM INTERROG EVL PM/IDS: CPT | Performed by: INTERNAL MEDICINE

## 2024-08-19 NOTE — PROGRESS NOTES
Dr amador pt   Medtronic dual pacer remote   Battery 12.7 yrs remaining    Aair<=>dddr     A paced 17.6%  V paced 75.6%    P waves 0.9  Rv waves 13.5    Atrial impedence 475  Vent impedence 627    Atrial threshold 0.5 @ 0.4  Vent threshold 1.5 @ 0.4    Atrial amplitude 1.5 @ 0.4  Vent amplitude 2.25 @ 0.4      Afib [place] 0%

## 2024-08-29 ENCOUNTER — HOSPITAL ENCOUNTER (OUTPATIENT)
Dept: WOMENS IMAGING | Age: 64
Discharge: HOME | End: 2024-08-29
Payer: COMMERCIAL

## 2024-08-29 DIAGNOSIS — Z12.31 VISIT FOR SCREENING MAMMOGRAM: ICD-10-CM

## 2024-08-29 PROCEDURE — 77063 BREAST TOMOSYNTHESIS BI: CPT

## 2024-10-22 ENCOUNTER — OFFICE VISIT (OUTPATIENT)
Dept: FAMILY MEDICINE CLINIC | Age: 64
End: 2024-10-22

## 2024-10-22 VITALS
HEIGHT: 67 IN | BODY MASS INDEX: 36.32 KG/M2 | HEART RATE: 69 BPM | DIASTOLIC BLOOD PRESSURE: 78 MMHG | OXYGEN SATURATION: 98 % | SYSTOLIC BLOOD PRESSURE: 128 MMHG | WEIGHT: 231.4 LBS

## 2024-10-22 DIAGNOSIS — R91.1 PULMONARY NODULE: ICD-10-CM

## 2024-10-22 DIAGNOSIS — R45.89 DEPRESSED MOOD: ICD-10-CM

## 2024-10-22 DIAGNOSIS — R10.32 LEFT INGUINAL PAIN: ICD-10-CM

## 2024-10-22 DIAGNOSIS — I10 PRIMARY HYPERTENSION: ICD-10-CM

## 2024-10-22 DIAGNOSIS — Z00.00 WELLNESS EXAMINATION: Primary | ICD-10-CM

## 2024-10-22 DIAGNOSIS — M25.552 LEFT HIP PAIN: ICD-10-CM

## 2024-10-22 DIAGNOSIS — E78.5 HYPERLIPIDEMIA, UNSPECIFIED HYPERLIPIDEMIA TYPE: ICD-10-CM

## 2024-10-22 RX ORDER — NAPROXEN 500 MG/1
500 TABLET ORAL 2 TIMES DAILY WITH MEALS
Qty: 180 TABLET | Refills: 0 | Status: SHIPPED | OUTPATIENT
Start: 2024-10-22

## 2024-10-22 RX ORDER — ESCITALOPRAM OXALATE 10 MG/1
10 TABLET ORAL DAILY
Qty: 90 TABLET | Refills: 0 | Status: SHIPPED | OUTPATIENT
Start: 2024-10-22

## 2024-10-22 RX ORDER — LEVOTHYROXINE SODIUM 112 UG/1
112 TABLET ORAL DAILY
COMMUNITY

## 2024-10-22 RX ORDER — ROSUVASTATIN CALCIUM 10 MG/1
10 TABLET, COATED ORAL DAILY
Qty: 90 TABLET | Refills: 3 | Status: SHIPPED | OUTPATIENT
Start: 2024-10-22

## 2024-10-22 RX ORDER — AMLODIPINE BESYLATE 5 MG/1
5 TABLET ORAL DAILY
Qty: 90 TABLET | Refills: 3 | Status: SHIPPED | OUTPATIENT
Start: 2024-10-22

## 2024-10-22 SDOH — ECONOMIC STABILITY: FOOD INSECURITY: WITHIN THE PAST 12 MONTHS, THE FOOD YOU BOUGHT JUST DIDN'T LAST AND YOU DIDN'T HAVE MONEY TO GET MORE.: NEVER TRUE

## 2024-10-22 SDOH — ECONOMIC STABILITY: FOOD INSECURITY: WITHIN THE PAST 12 MONTHS, YOU WORRIED THAT YOUR FOOD WOULD RUN OUT BEFORE YOU GOT MONEY TO BUY MORE.: NEVER TRUE

## 2024-10-22 SDOH — ECONOMIC STABILITY: INCOME INSECURITY: HOW HARD IS IT FOR YOU TO PAY FOR THE VERY BASICS LIKE FOOD, HOUSING, MEDICAL CARE, AND HEATING?: NOT HARD AT ALL

## 2024-10-22 ASSESSMENT — PATIENT HEALTH QUESTIONNAIRE - PHQ9
2. FEELING DOWN, DEPRESSED OR HOPELESS: NOT AT ALL
SUM OF ALL RESPONSES TO PHQ QUESTIONS 1-9: 0
1. LITTLE INTEREST OR PLEASURE IN DOING THINGS: NOT AT ALL
SUM OF ALL RESPONSES TO PHQ QUESTIONS 1-9: 0
SUM OF ALL RESPONSES TO PHQ QUESTIONS 1-9: 0
SUM OF ALL RESPONSES TO PHQ9 QUESTIONS 1 & 2: 0
SUM OF ALL RESPONSES TO PHQ QUESTIONS 1-9: 0

## 2024-10-22 NOTE — PATIENT INSTRUCTIONS
[email or web address]  Try to fill your mind with other thoughts when you want to go to sleep - like the stories above or memorizing scripture.    Use the naproxen twice daily - take with food, drink plenty of water - for 1-2 wks.  Then call or send message to update me on your hip pain.  You may need physical therapy.

## 2024-10-22 NOTE — PROGRESS NOTES
[facility]  [address] [address]  [phone]    Vivienne Espinosa (:  1960) is a 64 y.o. female, here for evaluation of the following chief complaint(s):  New Patient (Pt here to re establish care with Dr. Marks - was pt at previous office. Would like to discuss some new issues with Lt leg weakness/pain.  Releases of records papers signed to get records from [facility].  Mammograms done at [facility] and are in chart.  Coloscopy's done with Dr. Miranda at [facility].  Last labs done a year ago.)        Assessment & Plan     ASSESSMENT/PLAN:  1. Wellness examination  - Lipid Panel; Future  - CBC with Auto Differential; Future  - Comprehensive Metabolic Panel; Future  - Hemoglobin A1C; Future    2. Left hip pain    - XR HIP 2-3 VW W PELVIS LEFT; Future  - naproxen (NAPROSYN) 500 MG tablet; Take 1 tablet by mouth 2 times daily (with meals)  Dispense: 180 tablet; Refill: 0  - XR HIP 2-3 VW W PELVIS LEFT    3. Left inguinal pain    - XR HIP 2-3 VW W PELVIS LEFT; Future  - naproxen (NAPROSYN) 500 MG tablet; Take 1 tablet by mouth 2 times daily (with meals)  Dispense: 180 tablet; Refill: 0  - XR HIP 2-3 VW W PELVIS LEFT    4. Primary hypertension    - amLODIPine (NORVASC) 5 MG tablet; Take 1 tablet by mouth daily  Dispense: 90 tablet; Refill: 3    5. Pulmonary nodule      6. Depressed mood, anxiety    - escitalopram (LEXAPRO) 10 MG tablet; Take 1 tablet by mouth daily  Dispense: 90 tablet; Refill: 0    7. Hyperlipidemia, unspecified hyperlipidemia type    - rosuvastatin (CRESTOR) 10 MG tablet; Take 1 tablet by mouth daily  Dispense: 90 tablet; Refill: 3    Assessment & Plan  1. Annual wellness visit.  Her mammogram is current, and her colonoscopy was performed in , with the next one due in . A bone density test was conducted in , with the next one due in . Given her history of hysterectomy, a Pap test is not required. Her

## 2024-10-23 LAB
ALBUMIN: 4.5 G/DL
ALP BLD-CCNC: 107 U/L
ALT SERPL-CCNC: 15 U/L
ANION GAP SERPL CALCULATED.3IONS-SCNC: 12 MMOL/L
AST SERPL-CCNC: 16 U/L
BASOPHILS ABSOLUTE: 0.06 /ΜL
BASOPHILS RELATIVE PERCENT: 1.1 %
BILIRUB SERPL-MCNC: 0.5 MG/DL (ref 0.1–1.4)
BUN BLDV-MCNC: 16 MG/DL
CALCIUM SERPL-MCNC: 8.5 MG/DL
CHLORIDE BLD-SCNC: 103 MMOL/L
CHOLESTEROL, TOTAL: 158 MG/DL
CHOLESTEROL/HDL RATIO: 3.4
CO2: 26 MMOL/L
CREAT SERPL-MCNC: 0.93 MG/DL
EOSINOPHILS ABSOLUTE: 0.25 /ΜL
EOSINOPHILS RELATIVE PERCENT: 4.4 %
ESTIMATED AVERAGE GLUCOSE: ABNORMAL
GFR, ESTIMATED: 69
GLUCOSE BLD-MCNC: 107 MG/DL
HBA1C MFR BLD: 5.7 %
HCT VFR BLD CALC: 39.6 % (ref 36–46)
HDLC SERPL-MCNC: 46 MG/DL (ref 35–70)
HEMOGLOBIN: 12.9 G/DL (ref 12–16)
LDL CHOLESTEROL: 72
LYMPHOCYTES ABSOLUTE: 2.23 /ΜL
LYMPHOCYTES RELATIVE PERCENT: 39.4 %
MCH RBC QN AUTO: 29.9 PG
MCHC RBC AUTO-ENTMCNC: 32.6 G/DL
MCV RBC AUTO: 92 FL
MONOCYTES ABSOLUTE: 0.4 /ΜL
MONOCYTES RELATIVE PERCENT: 7.1 %
NEUTROPHILS ABSOLUTE: 2.7 /ΜL
NEUTROPHILS RELATIVE PERCENT: NORMAL
NONHDLC SERPL-MCNC: ABNORMAL MG/DL
PDW BLD-RTO: 12.4 %
PLATELET # BLD: 247 K/ΜL
PMV BLD AUTO: NORMAL FL
POTASSIUM SERPL-SCNC: 4.4 MMOL/L
RBC # BLD: 4.32 10^6/ΜL
SODIUM BLD-SCNC: 141 MMOL/L
TOTAL PROTEIN: 6.5 G/DL (ref 6.4–8.2)
TRIGL SERPL-MCNC: 198 MG/DL
VLDLC SERPL CALC-MCNC: 40 MG/DL
WBC # BLD: 5.7 10^3/ML

## 2024-10-24 DIAGNOSIS — Z00.00 WELLNESS EXAMINATION: ICD-10-CM

## 2024-10-25 ENCOUNTER — TELEPHONE (OUTPATIENT)
Dept: FAMILY MEDICINE CLINIC | Age: 64
End: 2024-10-25

## 2024-11-04 ENCOUNTER — OFFICE VISIT (OUTPATIENT)
Dept: FAMILY MEDICINE CLINIC | Age: 64
End: 2024-11-04
Payer: COMMERCIAL

## 2024-11-04 VITALS
BODY MASS INDEX: 35.68 KG/M2 | OXYGEN SATURATION: 97 % | WEIGHT: 227.8 LBS | RESPIRATION RATE: 16 BRPM | TEMPERATURE: 98.3 F | HEART RATE: 73 BPM | DIASTOLIC BLOOD PRESSURE: 72 MMHG | SYSTOLIC BLOOD PRESSURE: 128 MMHG

## 2024-11-04 DIAGNOSIS — H10.31 ACUTE BACTERIAL CONJUNCTIVITIS OF RIGHT EYE: ICD-10-CM

## 2024-11-04 DIAGNOSIS — H66.002 ACUTE SUPPURATIVE OTITIS MEDIA OF LEFT EAR WITHOUT SPONTANEOUS RUPTURE OF TYMPANIC MEMBRANE, RECURRENCE NOT SPECIFIED: ICD-10-CM

## 2024-11-04 DIAGNOSIS — J02.9 SORE THROAT: Primary | ICD-10-CM

## 2024-11-04 PROCEDURE — 99214 OFFICE O/P EST MOD 30 MIN: CPT | Performed by: STUDENT IN AN ORGANIZED HEALTH CARE EDUCATION/TRAINING PROGRAM

## 2024-11-04 RX ORDER — POLYMYXIN B SULFATE AND TRIMETHOPRIM 1; 10000 MG/ML; [USP'U]/ML
1 SOLUTION OPHTHALMIC EVERY 4 HOURS
Qty: 3 ML | Refills: 0 | Status: SHIPPED | OUTPATIENT
Start: 2024-11-04 | End: 2024-11-14

## 2024-11-04 RX ORDER — FLUTICASONE PROPIONATE 50 MCG
SPRAY, SUSPENSION (ML) NASAL
Qty: 16 G | Refills: 0 | Status: SHIPPED | OUTPATIENT
Start: 2024-11-04

## 2024-11-04 ASSESSMENT — ENCOUNTER SYMPTOMS
SORE THROAT: 1
EYE DISCHARGE: 1
SHORTNESS OF BREATH: 0
EYE PAIN: 0
SINUS PAIN: 1
CONSTIPATION: 0
EYE ITCHING: 0
SINUS PRESSURE: 1
NAUSEA: 0
RHINORRHEA: 0
EYE REDNESS: 1
PHOTOPHOBIA: 0
DIARRHEA: 0
COUGH: 1
VOMITING: 0

## 2024-11-04 NOTE — PATIENT INSTRUCTIONS
Symptomatic therapy suggested: gargle for sore throat, use mist at bedside for congestion.  Apply facial warm packs for sinus pain. Recommend increased hydration, small frequent meals, and resting when able.     For cough/congestion either Mucinex DM or Robitussin DM, take dose as recommended on bottle.     For sore throat/drainage/post nasal drip/cough Flonase 2 puffs per nostril twice a day for one week then once at night for one week.    For sinus pressure/pain, ear fullness can also ask for Sudafed behind the counter at the pharmacy. I recommend taking every 12 hours until ear pain/hearing improves. Popping is a good sign.

## 2024-11-04 NOTE — PROGRESS NOTES
[facility] PROFESSIONAL SERVS  [facility]  [address] [facility] [place] [address]  [phone]     Vivienne Espinosa is a 64 y.o. female who presents today for:  Chief Complaint   Patient presents with    Pharyngitis     X 6 days, OTC Nyquil, cough drops, Florence seltzer.     Headache     X 4 days, sinus/eyes, off and on     Fever     X 4 days, off and on        Assessment/Plan:     Vivienne was seen today for pharyngitis, headache and fever.    Diagnoses and all orders for this visit:    Sore throat  -     fluticasone (FLONASE) 50 MCG/ACT nasal spray; Two puffs per nostril twice daily for one week then once daily for one week.    Acute bacterial conjunctivitis of right eye  -     trimethoprim-polymyxin b (POLYTRIM) 73482-6.1 UNIT/ML-% ophthalmic solution; Place 1 drop into the right eye every 4 hours for 10 days    Acute suppurative otitis media of left ear without spontaneous rupture of tympanic membrane, recurrence not specified  -     amoxicillin-clavulanate (AUGMENTIN) 875-125 MG per tablet; Take 1 tablet by mouth 2 times daily for 7 days      Sore throat: Acute/uncontrolled, will treat as postnasal drip as above with Flonase.    Acute bacterial conjunctivitis: Acute/uncontrolled, right eye only, visible purulent drainage on physical exam-treat as bacterial infection as above.    Otitis media: Acute/uncontrolled, present on exam, TM intact, antibiotics as above.           No follow-ups on file.      Medications Prescribed:  Orders Placed This Encounter   Medications    amoxicillin-clavulanate (AUGMENTIN) 875-125 MG per tablet     Sig: Take 1 tablet by mouth 2 times daily for 7 days     Dispense:  14 tablet     Refill:  0    trimethoprim-polymyxin b (POLYTRIM) 99644-8.1 UNIT/ML-% ophthalmic solution     Sig: Place 1 drop into the right eye every 4 hours for 10 days     Dispense:  3 mL     Refill:  0    fluticasone (FLONASE) 50 MCG/ACT nasal spray     Sig: Two puffs per

## 2024-11-06 ENCOUNTER — TELEPHONE (OUTPATIENT)
Dept: FAMILY MEDICINE CLINIC | Age: 64
End: 2024-11-06

## 2024-11-06 DIAGNOSIS — J01.90 ACUTE BACTERIAL SINUSITIS: Primary | ICD-10-CM

## 2024-11-06 DIAGNOSIS — B96.89 ACUTE BACTERIAL SINUSITIS: Primary | ICD-10-CM

## 2024-11-06 RX ORDER — AZITHROMYCIN 250 MG/1
TABLET, FILM COATED ORAL
Qty: 6 TABLET | Refills: 0 | Status: SHIPPED | OUTPATIENT
Start: 2024-11-06 | End: 2024-11-16

## 2024-11-06 NOTE — TELEPHONE ENCOUNTER
Sent in additional antibiotic for patient to take for dual coverage. If no better Monday, I would recommend her being seen again.     Electronically signed by Kelley Rajan MD on 11/6/24 at 4:09 PM EST

## 2024-11-06 NOTE — TELEPHONE ENCOUNTER
Pt called stating that she was seen on Monday for a bad cold and sx are no better. Pt states that she is continuing everything that was recommended at appointment and is taking mediation as prescribed. Sudafed, tylenol, Augmentin, Flonase,  and ear drops. Pt is asking if there is anything else she can try?

## 2024-11-12 ENCOUNTER — OFFICE VISIT (OUTPATIENT)
Dept: FAMILY MEDICINE CLINIC | Age: 64
End: 2024-11-12

## 2024-11-12 VITALS
OXYGEN SATURATION: 95 % | BODY MASS INDEX: 36.24 KG/M2 | HEART RATE: 70 BPM | RESPIRATION RATE: 18 BRPM | DIASTOLIC BLOOD PRESSURE: 82 MMHG | SYSTOLIC BLOOD PRESSURE: 128 MMHG | WEIGHT: 231.4 LBS

## 2024-11-12 DIAGNOSIS — H93.8X2 SENSATION OF FULLNESS IN LEFT EAR: Primary | ICD-10-CM

## 2024-11-12 DIAGNOSIS — Z23 NEED FOR INFLUENZA VACCINATION: ICD-10-CM

## 2024-11-12 RX ORDER — PREDNISONE 20 MG/1
40 TABLET ORAL DAILY
Qty: 10 TABLET | Refills: 0 | Status: SHIPPED | OUTPATIENT
Start: 2024-11-12 | End: 2024-11-17

## 2024-11-12 ASSESSMENT — ENCOUNTER SYMPTOMS
EYE PAIN: 0
EYE DISCHARGE: 0
SHORTNESS OF BREATH: 0
CONSTIPATION: 0
EYE REDNESS: 0
SINUS PAIN: 0
VOMITING: 0
DIARRHEA: 0
RHINORRHEA: 0
EYE ITCHING: 0
COUGH: 1
SORE THROAT: 0
SINUS PRESSURE: 0
NAUSEA: 0
PHOTOPHOBIA: 0

## 2024-11-12 NOTE — PROGRESS NOTES
[facility]  [address] [facility] [address]  [phone]     Vivienne Espinosa is a 64 y.o. female who presents today for:  Chief Complaint   Patient presents with    Ear Fullness     Left ear, pain comes and goes.     Congestion     Not going away        Assessment/Plan:     Vivienne was seen today for ear fullness and congestion.    Diagnoses and all orders for this visit:    Sensation of fullness in left ear  -     predniSONE (DELTASONE) 20 MG tablet; Take 2 tablets by mouth daily for 5 days    Need for influenza vaccination  -     Influenza, FLUCELVAX Trivalent, (age 6 mo+) IM, Preservative Free, 0.5mL        Ear fullness: acute/uncontrolled, much improved from prior, however still having fluid. Recommended continued zyrtec and flonase. Will trial Prednisone as above to help improve hearing. Otherwise supportive care. If no improvement in one week can consider referral to ENT.     Flu shot as above.          No follow-ups on file.      Medications Prescribed:  Orders Placed This Encounter   Medications    predniSONE (DELTASONE) 20 MG tablet     Sig: Take 2 tablets by mouth daily for 5 days     Dispense:  10 tablet     Refill:  0       Future Appointments   Date Time Provider Department Center   12/17/2024  8:00 AM [facility]   3/11/2025  8:00 AM SCHEDULE, [facility] PACER NURSE N [facility]   8/18/2025  2:15 PM Keith Boyd [facility]       HPI:     HPI  64 yr female with pmhx significant for lhd, htn, thyroid nodule who presents as an office visit for ear fullness and congestion.    Patient states that overall she is feeling slightly better however still having left ear fullness.  Does have intermittent pain that comes and goes.  Is taking Sudafed, Flonase, Zyrtec and not much improvement.  States he saw having congestion and ear fullness.  Has not had any popping yet from the

## 2024-11-12 NOTE — PROGRESS NOTES
After obtaining consent, and per orders of Dr. Rajan, injection of Flucelvax given in Left deltoid by LINDSEY BESS MA. Patient instructed to remain in clinic for 20 minutes afterwards, and to report any adverse reaction to me immediately.    Immunizations Administered       Name Date Dose Route    Influenza, FLUCELVAX, (age 6 mo+) IM, Trivalent PF, 0.5mL 11/12/2024 0.5 mL Intramuscular    Site: Deltoid- Left    Lot: [id number]    NDC: 08116-457-35          VIS declined. Vaccine checklist completed. Tolerated appropriately

## 2024-11-26 DIAGNOSIS — J02.9 SORE THROAT: ICD-10-CM

## 2024-11-26 RX ORDER — FLUTICASONE PROPIONATE 50 MCG
SPRAY, SUSPENSION (ML) NASAL
Qty: 48 EACH | Refills: 1 | Status: SHIPPED | OUTPATIENT
Start: 2024-11-26

## 2024-12-03 PROCEDURE — 93296 REM INTERROG EVL PM/IDS: CPT | Performed by: INTERNAL MEDICINE

## 2024-12-03 PROCEDURE — 93294 REM INTERROG EVL PM/LDLS PM: CPT | Performed by: INTERNAL MEDICINE

## 2024-12-08 ENCOUNTER — HOSPITAL ENCOUNTER (OUTPATIENT)
Dept: CT IMAGING | Age: 64
Discharge: HOME | End: 2024-12-08
Payer: COMMERCIAL

## 2024-12-08 ENCOUNTER — HOSPITAL ENCOUNTER (OUTPATIENT)
Age: 64
End: 2024-12-08

## 2024-12-08 DIAGNOSIS — R91.1 NODULE OF LOWER LOBE OF RIGHT LUNG: ICD-10-CM

## 2024-12-08 PROCEDURE — 71250 CT THORAX DX C-: CPT

## 2024-12-17 ENCOUNTER — OFFICE VISIT (OUTPATIENT)
Dept: FAMILY MEDICINE CLINIC | Age: 64
End: 2024-12-17
Payer: COMMERCIAL

## 2024-12-17 VITALS
DIASTOLIC BLOOD PRESSURE: 82 MMHG | HEART RATE: 74 BPM | OXYGEN SATURATION: 96 % | WEIGHT: 231.4 LBS | HEIGHT: 67 IN | SYSTOLIC BLOOD PRESSURE: 138 MMHG | BODY MASS INDEX: 36.32 KG/M2

## 2024-12-17 DIAGNOSIS — R91.1 NODULE OF LOWER LOBE OF RIGHT LUNG: Primary | ICD-10-CM

## 2024-12-17 DIAGNOSIS — M25.552 LEFT HIP PAIN: ICD-10-CM

## 2024-12-17 DIAGNOSIS — R45.89 DEPRESSED MOOD: ICD-10-CM

## 2024-12-17 PROCEDURE — 99214 OFFICE O/P EST MOD 30 MIN: CPT | Performed by: FAMILY MEDICINE

## 2024-12-17 RX ORDER — ESCITALOPRAM OXALATE 20 MG/1
20 TABLET ORAL DAILY
Qty: 90 TABLET | Refills: 3 | Status: SHIPPED | OUTPATIENT
Start: 2024-12-17

## 2024-12-17 NOTE — PROGRESS NOTES
[facility]  [address] [place] [address]  [phone]    Vivienne Espinosa (:  1960) is a 64 y.o. female, here for evaluation of the following chief complaint(s):  Follow-up (Pt here to f/u for CT chest on  - states has nodule on lung that needed evaluated. )        Assessment & Plan     ASSESSMENT/PLAN:  1. Nodule of lower lobe of right lung    - CT CHEST WO CONTRAST; Future    2. Left hip pain      3. Depressed mood    - escitalopram (LEXAPRO) 20 MG tablet; Take 1 tablet by mouth daily  Dispense: 90 tablet; Refill: 3    Assessment & Plan  1. Lung nodule.  The CT scan results indicate that the lung nodule remains stable in size, measuring 11 x 12 mm. A follow-up CT scan has been scheduled for 6 months from now to monitor any potential changes.    2. Hip and groin pain.  The patient reports that the hip and groin pain is on and off but has been better than before. She uses naproxen as needed for pain management and has not required it recently.    3. Depression.  The dosage of Lexapro will be increased to help manage her symptoms. The prescription will be sent to [facility].    4. Kidney stones.  The CT scan showed the presence of kidney stones.    5. Fatty liver.  The CT scan continues to show fatty liver changes, which have not progressed. The patient is advised to exercise regularly and maintain a healthy weight to manage this condition.    Follow-up  The patient will follow up in 6 months after the next CT scan.    Return in about 6 months (around 2025).       There are no Patient Instructions on file for this visit.      Subjective   SUBJECTIVE/OBJECTIVE:  History of Present Illness  The patient presents for evaluation of a lung nodule, hip and groin pain, and depression.    She has a history of thyroid cancer and is concerned about the potential recurrence of the disease, given her father's experience with

## 2025-02-18 ENCOUNTER — OFFICE VISIT (OUTPATIENT)
Dept: FAMILY MEDICINE CLINIC | Age: 65
End: 2025-02-18
Payer: COMMERCIAL

## 2025-02-18 VITALS
SYSTOLIC BLOOD PRESSURE: 118 MMHG | HEIGHT: 67 IN | OXYGEN SATURATION: 96 % | DIASTOLIC BLOOD PRESSURE: 82 MMHG | HEART RATE: 71 BPM | BODY MASS INDEX: 37.51 KG/M2 | WEIGHT: 239 LBS

## 2025-02-18 DIAGNOSIS — K59.00 CONSTIPATION, UNSPECIFIED CONSTIPATION TYPE: ICD-10-CM

## 2025-02-18 DIAGNOSIS — J11.1 INFLUENZA: Primary | ICD-10-CM

## 2025-02-18 PROCEDURE — 99213 OFFICE O/P EST LOW 20 MIN: CPT | Performed by: FAMILY MEDICINE

## 2025-02-18 RX ORDER — BENZONATATE 200 MG/1
200 CAPSULE ORAL 3 TIMES DAILY PRN
Qty: 30 CAPSULE | Refills: 0 | Status: SHIPPED | OUTPATIENT
Start: 2025-02-18 | End: 2025-02-28

## 2025-02-18 SDOH — ECONOMIC STABILITY: FOOD INSECURITY: WITHIN THE PAST 12 MONTHS, YOU WORRIED THAT YOUR FOOD WOULD RUN OUT BEFORE YOU GOT MONEY TO BUY MORE.: NEVER TRUE

## 2025-02-18 SDOH — ECONOMIC STABILITY: FOOD INSECURITY: WITHIN THE PAST 12 MONTHS, THE FOOD YOU BOUGHT JUST DIDN'T LAST AND YOU DIDN'T HAVE MONEY TO GET MORE.: NEVER TRUE

## 2025-02-18 ASSESSMENT — PATIENT HEALTH QUESTIONNAIRE - PHQ9
SUM OF ALL RESPONSES TO PHQ QUESTIONS 1-9: 0
SUM OF ALL RESPONSES TO PHQ QUESTIONS 1-9: 0
SUM OF ALL RESPONSES TO PHQ9 QUESTIONS 1 & 2: 0
SUM OF ALL RESPONSES TO PHQ QUESTIONS 1-9: 0
2. FEELING DOWN, DEPRESSED OR HOPELESS: NOT AT ALL
1. LITTLE INTEREST OR PLEASURE IN DOING THINGS: NOT AT ALL
SUM OF ALL RESPONSES TO PHQ QUESTIONS 1-9: 0

## 2025-02-18 NOTE — PROGRESS NOTES
[facility] PROFESSIONAL SERVS  [facility]  [address] [facility] [address]  [phone]    Vivienne Espinosa (:  1960) is a 64 y.o. female, here for evaluation of the following chief complaint(s):  Cold Symptoms (Since , has been having coughing, runny nose, fatigue, headache.  Has tried netti pot, Nyquil, Mucinex, Vicks, Tylenol - has had little relief. )        Assessment & Plan     ASSESSMENT/PLAN:  1. Influenza  Her symptoms suggest a viral etiology, likely influenza A, given the current prevalence. The sinus discomfort she experiences when leaning forward is likely a manifestation of the virus rather than a bacterial infection, as the symptoms have not progressively worsened. She has been advised to continue her current over-the-counter medications. She has been counseled on the importance of exercise, adequate rest, and the potential benefits of vitamin C and zinc in boosting her immune system. She has been reassured that daily intake of vitamin C should not exacerbate her kidney stones. A prescription for Tessalon Perles will be provided to alleviate her cough and facilitate rest. She has been instructed to report any worsening of her sinus symptoms via MyChart, at which point an antibiotic may be considered. She has also been advised to monitor for any recurrence of fever or worsening cough, which could indicate pneumonia, and to report these symptoms immediately.    2. Constipation, unspecified constipation type  Her constipation is not indicative of a serious underlying condition. She has been advised to incorporate fiber into her diet gradually to avoid bloating. She has been instructed to take one fiber supplement daily with a meal. The use of senna, a stimulant laxative, has been discussed, and she has been advised that it is not typically the first-line treatment for constipation. The use of MiraLAX has been recommended as needed. She has

## 2025-02-18 NOTE — PATIENT INSTRUCTIONS
Continue adequate water intake.  Fiber supplement:  start with 1/4-1/2 recommended dose over the counter fiber supplement.  Use daily.  Drink plenty of water.  Gradually increase dose every 3 days or so until stools are formed.  Miralax is safe to use daily - 17g in 8 ounces water or juice once daily   Goal is an easy to pass bowel movement once daily - please let me know if you are having ongoing issues with this.      You could save the Herbs and Prunes supplement for as needed since it does include a stimulant laxative.

## 2025-03-26 NOTE — PATIENT INSTRUCTIONS
How was your appointment at the device clinic today?  Did one of our pacemaker nurses make your day?  Is there something we can do to make your appointment better?    Please let us know about it on the survey you receive in the mail or let us know on a [employer] message!    Heck, we even love Post it notes!    We appreciate you and want to make your appointment with us the best we can!       Thank you!  Florence Sanchez and Morgan

## 2025-04-02 ENCOUNTER — HOSPITAL ENCOUNTER (OUTPATIENT)
Dept: CT IMAGING | Age: 65
Discharge: HOME | End: 2025-04-02
Attending: UROLOGY
Payer: COMMERCIAL

## 2025-04-02 ENCOUNTER — HOSPITAL ENCOUNTER (OUTPATIENT)
Age: 65
Discharge: HOME | End: 2025-04-02
Attending: UROLOGY
Payer: COMMERCIAL

## 2025-04-02 DIAGNOSIS — N20.0 CALCULUS OF KIDNEY: ICD-10-CM

## 2025-04-02 LAB
CREAT BLD-MCNC: 0.7 MG/DL (ref 0.5–1.2)
GFR SERPL CREATININE-BSD FRML MDRD: > 90 ML/MIN/1.73M2

## 2025-04-02 PROCEDURE — 82565 ASSAY OF CREATININE: CPT

## 2025-04-02 PROCEDURE — 6360000004 HC RX CONTRAST MEDICATION: Performed by: UROLOGY

## 2025-04-02 PROCEDURE — 74170 CT ABD WO CNTRST FLWD CNTRST: CPT

## 2025-04-02 RX ORDER — IOPAMIDOL 755 MG/ML
100 INJECTION, SOLUTION INTRAVASCULAR
Status: COMPLETED | OUTPATIENT
Start: 2025-04-02 | End: 2025-04-02

## 2025-04-02 RX ADMIN — IOPAMIDOL 100 ML: 755 INJECTION, SOLUTION INTRAVENOUS at 17:19

## 2025-04-14 ENCOUNTER — CLINICAL SUPPORT (OUTPATIENT)
Dept: CARDIOLOGY CLINIC | Age: 65
End: 2025-04-14

## 2025-04-14 DIAGNOSIS — Z95.0 PACEMAKER: Primary | ICD-10-CM

## 2025-06-03 PROCEDURE — 93294 REM INTERROG EVL PM/LDLS PM: CPT | Performed by: INTERNAL MEDICINE

## 2025-06-03 PROCEDURE — 93296 REM INTERROG EVL PM/IDS: CPT | Performed by: INTERNAL MEDICINE

## 2025-06-24 ENCOUNTER — HOSPITAL ENCOUNTER (OUTPATIENT)
Dept: CT IMAGING | Age: 65
Discharge: HOME | End: 2025-06-24
Attending: FAMILY MEDICINE
Payer: COMMERCIAL

## 2025-06-24 DIAGNOSIS — R91.1 NODULE OF LOWER LOBE OF RIGHT LUNG: ICD-10-CM

## 2025-06-24 PROCEDURE — 71250 CT THORAX DX C-: CPT

## 2025-06-25 ENCOUNTER — RESULTS FOLLOW-UP (OUTPATIENT)
Dept: FAMILY MEDICINE CLINIC | Age: 65
End: 2025-06-25

## 2025-07-01 ENCOUNTER — PATIENT MESSAGE (OUTPATIENT)
Dept: FAMILY MEDICINE CLINIC | Age: 65
End: 2025-07-01

## 2025-07-01 ENCOUNTER — OFFICE VISIT (OUTPATIENT)
Dept: FAMILY MEDICINE CLINIC | Age: 65
End: 2025-07-01
Payer: COMMERCIAL

## 2025-07-01 VITALS
WEIGHT: 233.8 LBS | BODY MASS INDEX: 36.62 KG/M2 | DIASTOLIC BLOOD PRESSURE: 78 MMHG | HEART RATE: 78 BPM | RESPIRATION RATE: 16 BRPM | OXYGEN SATURATION: 94 % | SYSTOLIC BLOOD PRESSURE: 122 MMHG

## 2025-07-01 DIAGNOSIS — Z85.850 HISTORY OF THYROID CANCER: ICD-10-CM

## 2025-07-01 DIAGNOSIS — R91.1 NODULE OF LOWER LOBE OF RIGHT LUNG: Primary | ICD-10-CM

## 2025-07-01 DIAGNOSIS — I10 PRIMARY HYPERTENSION: ICD-10-CM

## 2025-07-01 DIAGNOSIS — R45.89 DEPRESSED MOOD: ICD-10-CM

## 2025-07-01 DIAGNOSIS — Z00.00 WELLNESS EXAMINATION: ICD-10-CM

## 2025-07-01 PROCEDURE — 3078F DIAST BP <80 MM HG: CPT | Performed by: FAMILY MEDICINE

## 2025-07-01 PROCEDURE — 3074F SYST BP LT 130 MM HG: CPT | Performed by: FAMILY MEDICINE

## 2025-07-01 PROCEDURE — 99214 OFFICE O/P EST MOD 30 MIN: CPT | Performed by: FAMILY MEDICINE

## 2025-07-01 SDOH — ECONOMIC STABILITY: FOOD INSECURITY: WITHIN THE PAST 12 MONTHS, YOU WORRIED THAT YOUR FOOD WOULD RUN OUT BEFORE YOU GOT MONEY TO BUY MORE.: NEVER TRUE

## 2025-07-01 SDOH — ECONOMIC STABILITY: FOOD INSECURITY: WITHIN THE PAST 12 MONTHS, THE FOOD YOU BOUGHT JUST DIDN'T LAST AND YOU DIDN'T HAVE MONEY TO GET MORE.: NEVER TRUE

## 2025-07-01 ASSESSMENT — PATIENT HEALTH QUESTIONNAIRE - PHQ9
2. FEELING DOWN, DEPRESSED OR HOPELESS: NOT AT ALL
SUM OF ALL RESPONSES TO PHQ QUESTIONS 1-9: 0
SUM OF ALL RESPONSES TO PHQ QUESTIONS 1-9: 0
1. LITTLE INTEREST OR PLEASURE IN DOING THINGS: NOT AT ALL
SUM OF ALL RESPONSES TO PHQ QUESTIONS 1-9: 0
SUM OF ALL RESPONSES TO PHQ QUESTIONS 1-9: 0

## 2025-07-01 NOTE — PROGRESS NOTES
[facility] PROFESSIONAL [facility]ACTICE  [address] [facility] [id number] [phone]    Vivienne Espinosa (:  1960) is a 64 y.o. female, here for evaluation of the following chief complaint(s):  Follow-up (CT )        Assessment & Plan     ASSESSMENT/PLAN:  1. Nodule of lower lobe of right lung  See below.     2. History of thyroid cancer    3. Depressed mood  - The current dose of Lexapro is effective.  - No changes are needed.    4. Primary hypertension  Controlled.    5. Wellness examination    - Lipid Panel; Future  - Comprehensive Metabolic Panel; Future  - CBC with Auto Differential; Future  - Hemoglobin A1C; Future        Assessment & Plan  1. Lung nodule.  - The lung nodule has shown a decrease in size from 15 mm in 2023 to 11 x 12 mm in 2024.  - The most recent CT scan 25 indicated stability.  - A discussion with Dr. Shena Salmon indicated the importance of ongoing follow up given the size and location and ground-glass character of the nodule - she recommends 6 month follow up, but could be pushed out to yearly follow up if pt desires. Will discuss with pt.    2. Pruritus.  - She reports itching on her hand, which may be due to dry skin.  - She is advised to apply hand cream. If the itching persists, hydrocortisone cream can be used.    4. Health maintenance.  - Blood work will be ordered for the end of the year.  - The last annual checkup was in 2024.  - The blood work should be completed before her wellness visit.    Return in about 4 months (around 2025) for wellness.       There are no Patient Instructions on file for this visit.      Subjective   SUBJECTIVE/OBJECTIVE:  History of Present Illness  The patient presents for evaluation of a lung nodule, pruritus, and medication management.    She has a history of thyroid cancer and reports no current concerns related to this condition. The lung

## 2025-08-18 ENCOUNTER — OFFICE VISIT (OUTPATIENT)
Dept: CARDIOLOGY CLINIC | Age: 65
End: 2025-08-18
Payer: COMMERCIAL

## 2025-08-18 VITALS
DIASTOLIC BLOOD PRESSURE: 76 MMHG | WEIGHT: 233 LBS | HEIGHT: 67 IN | BODY MASS INDEX: 36.57 KG/M2 | HEART RATE: 80 BPM | SYSTOLIC BLOOD PRESSURE: 126 MMHG

## 2025-08-18 DIAGNOSIS — R06.09 DYSPNEA ON EXERTION: Primary | ICD-10-CM

## 2025-08-18 PROCEDURE — 93000 ELECTROCARDIOGRAM COMPLETE: CPT | Performed by: INTERNAL MEDICINE

## 2025-08-18 PROCEDURE — 99214 OFFICE O/P EST MOD 30 MIN: CPT | Performed by: INTERNAL MEDICINE

## 2025-08-29 ENCOUNTER — HOSPITAL ENCOUNTER (OUTPATIENT)
Dept: WOMENS IMAGING | Age: 65
Discharge: HOME | End: 2025-08-29
Payer: COMMERCIAL

## 2025-08-29 VITALS — WEIGHT: 210 LBS | BODY MASS INDEX: 32.96 KG/M2 | HEIGHT: 67 IN

## 2025-08-29 DIAGNOSIS — Z12.31 VISIT FOR SCREENING MAMMOGRAM: ICD-10-CM

## 2025-08-29 PROCEDURE — 77063 BREAST TOMOSYNTHESIS BI: CPT
